# Patient Record
Sex: FEMALE | Race: ASIAN
[De-identification: names, ages, dates, MRNs, and addresses within clinical notes are randomized per-mention and may not be internally consistent; named-entity substitution may affect disease eponyms.]

---

## 2018-07-12 ENCOUNTER — HOSPITAL ENCOUNTER (INPATIENT)
Dept: HOSPITAL 54 - ER | Age: 68
LOS: 3 days | Discharge: PSYCHIATRIC OR SUBSTANCE USE FACILITY | DRG: 682 | End: 2018-07-15
Payer: MEDICARE

## 2018-07-12 VITALS — SYSTOLIC BLOOD PRESSURE: 149 MMHG | DIASTOLIC BLOOD PRESSURE: 91 MMHG

## 2018-07-12 VITALS — HEIGHT: 60 IN | WEIGHT: 104.31 LBS | BODY MASS INDEX: 20.48 KG/M2

## 2018-07-12 DIAGNOSIS — G93.41: ICD-10-CM

## 2018-07-12 DIAGNOSIS — E87.5: ICD-10-CM

## 2018-07-12 DIAGNOSIS — E43: ICD-10-CM

## 2018-07-12 DIAGNOSIS — I13.0: ICD-10-CM

## 2018-07-12 DIAGNOSIS — I21.A1: ICD-10-CM

## 2018-07-12 DIAGNOSIS — I50.9: ICD-10-CM

## 2018-07-12 DIAGNOSIS — N18.9: ICD-10-CM

## 2018-07-12 DIAGNOSIS — E83.42: ICD-10-CM

## 2018-07-12 DIAGNOSIS — K21.9: ICD-10-CM

## 2018-07-12 DIAGNOSIS — M19.90: ICD-10-CM

## 2018-07-12 DIAGNOSIS — F32.9: ICD-10-CM

## 2018-07-12 DIAGNOSIS — D53.9: ICD-10-CM

## 2018-07-12 DIAGNOSIS — N17.0: Primary | ICD-10-CM

## 2018-07-12 DIAGNOSIS — F25.0: ICD-10-CM

## 2018-07-12 DIAGNOSIS — D72.1: ICD-10-CM

## 2018-07-12 DIAGNOSIS — E86.0: ICD-10-CM

## 2018-07-12 DIAGNOSIS — F23: ICD-10-CM

## 2018-07-12 DIAGNOSIS — G40.909: ICD-10-CM

## 2018-07-12 DIAGNOSIS — F01.51: ICD-10-CM

## 2018-07-12 LAB
ALBUMIN SERPL BCP-MCNC: 2.8 G/DL (ref 3.4–5)
ALP SERPL-CCNC: 189 U/L (ref 46–116)
ALT SERPL W P-5'-P-CCNC: 47 U/L (ref 12–78)
APAP SERPL-MCNC: < 2 UG/ML (ref 10–30)
AST SERPL W P-5'-P-CCNC: 27 U/L (ref 15–37)
BASOPHILS # BLD AUTO: 0 /CMM (ref 0–0.2)
BASOPHILS NFR BLD AUTO: 0.4 % (ref 0–2)
BILIRUB DIRECT SERPL-MCNC: 0 MG/DL (ref 0–0.2)
BILIRUB SERPL-MCNC: 0.1 MG/DL (ref 0.2–1)
BUN SERPL-MCNC: 70 MG/DL (ref 7–18)
CALCIUM SERPL-MCNC: 9.5 MG/DL (ref 8.5–10.1)
CHLORIDE SERPL-SCNC: 109 MMOL/L (ref 98–107)
CO2 SERPL-SCNC: 26 MMOL/L (ref 21–32)
CREAT SERPL-MCNC: 1.7 MG/DL (ref 0.6–1.3)
EOSINOPHIL NFR BLD AUTO: 13.4 % (ref 0–6)
ETHANOL SERPL-MCNC: < 3 MG/DL (ref 0–0)
GLUCOSE SERPL-MCNC: 90 MG/DL (ref 74–106)
HCT VFR BLD AUTO: 26 % (ref 33–45)
HGB BLD-MCNC: 8.9 G/DL (ref 11.5–14.8)
LYMPHOCYTES NFR BLD AUTO: 1.5 /CMM (ref 0.8–4.8)
LYMPHOCYTES NFR BLD AUTO: 30 % (ref 20–44)
MCH RBC QN AUTO: 35 PG (ref 26–33)
MCHC RBC AUTO-ENTMCNC: 34 G/DL (ref 31–36)
MCV RBC AUTO: 100 FL (ref 82–100)
MONOCYTES NFR BLD AUTO: 0.6 /CMM (ref 0.1–1.3)
MONOCYTES NFR BLD AUTO: 11.9 % (ref 2–12)
NEUTROPHILS # BLD AUTO: 2.3 /CMM (ref 1.8–8.9)
NEUTROPHILS NFR BLD AUTO: 44.3 % (ref 43–81)
NT-PROBNP SERPL-MCNC: 898 PG/ML (ref 0–125)
PLATELET # BLD AUTO: 256 /CMM (ref 150–450)
POTASSIUM SERPL-SCNC: 6.4 MMOL/L (ref 3.5–5.1)
PROT SERPL-MCNC: 7.4 G/DL (ref 6.4–8.2)
RBC # BLD AUTO: 2.59 MIL/UL (ref 4–5.2)
RDW COEFFICIENT OF VARIATION: 13.6 (ref 11.5–15)
SALICYLATES SERPL-MCNC: < 2.8 MG/DL (ref 2.8–20)
SODIUM SERPL-SCNC: 141 MMOL/L (ref 136–145)
TROPONIN I SERPL-MCNC: 0.15 NG/ML (ref 0–0.06)
WBC NRBC COR # BLD AUTO: 5.1 K/UL (ref 4.3–11)

## 2018-07-12 PROCEDURE — A4606 OXYGEN PROBE USED W OXIMETER: HCPCS

## 2018-07-12 PROCEDURE — Z7610: HCPCS

## 2018-07-12 PROCEDURE — G0480 DRUG TEST DEF 1-7 CLASSES: HCPCS

## 2018-07-12 NOTE — NUR
PT IS STANDING UP AND STATED THAT SHE HAS TO URINATE. PT WAS CLEANED AND NEW 
DIAPER APPLIED BY KANNAN WEBSTER

## 2018-07-12 NOTE — NUR
PT BIB PA WITH A C/[facility] INCREASED AGGITATION AT [facility]. PT APPEARS RESTLESS. PT WAS 
TRIAGED AND TAKEN TO ROOM #16. PT IS ON THE MONITOR AND CONTINUOUS PULSE OX.

## 2018-07-12 NOTE — NUR
RN NOTES:



PATIENT HAS CALMED DOWN AND IS CURRENTLY SLEEPING.

-------------------------------------------------------------------------------

Addendum: 07/13/18 at 0016 by KAILYN RAMACHANDRAN RN

-------------------------------------------------------------------------------

ADDITIONAL NOTES:



Tele monitor in place, Sinus loni 56

## 2018-07-12 NOTE — NUR
TELE RN ADMISSION NOTES:



Patient came to unit via gurney. Patient is confused, aggressive and combative. When given 
care, patient started screaming, hitting, and kicking staff. Patient pulled out the tele 
monitor and IV line. Skin and body assessment done. Skin intact, bruises on R) arm and R) 
leg and scratch on forehead noted. Pictures in chart. Safety measures in place. Bed in low 
locked position, bed alarms on. Will monitor accordingly

## 2018-07-12 NOTE — NUR
RN NOTES:



Patient put on bilateral soft restraints on wrist. Patient aggressive and combative, pulling 
out IV line and  tele monitor and hitting staff. Safety precautions in place. Monitor 
accordingly

## 2018-07-13 VITALS — SYSTOLIC BLOOD PRESSURE: 102 MMHG | DIASTOLIC BLOOD PRESSURE: 56 MMHG

## 2018-07-13 VITALS — SYSTOLIC BLOOD PRESSURE: 152 MMHG | DIASTOLIC BLOOD PRESSURE: 78 MMHG

## 2018-07-13 VITALS — DIASTOLIC BLOOD PRESSURE: 78 MMHG | SYSTOLIC BLOOD PRESSURE: 152 MMHG

## 2018-07-13 VITALS — SYSTOLIC BLOOD PRESSURE: 144 MMHG | DIASTOLIC BLOOD PRESSURE: 64 MMHG

## 2018-07-13 VITALS — DIASTOLIC BLOOD PRESSURE: 88 MMHG | SYSTOLIC BLOOD PRESSURE: 160 MMHG

## 2018-07-13 VITALS — DIASTOLIC BLOOD PRESSURE: 64 MMHG | SYSTOLIC BLOOD PRESSURE: 144 MMHG

## 2018-07-13 VITALS — SYSTOLIC BLOOD PRESSURE: 123 MMHG | DIASTOLIC BLOOD PRESSURE: 73 MMHG

## 2018-07-13 LAB
ALBUMIN SERPL BCP-MCNC: 2.9 G/DL (ref 3.4–5)
ALP SERPL-CCNC: 187 U/L (ref 46–116)
ALT SERPL W P-5'-P-CCNC: 51 U/L (ref 12–78)
AST SERPL W P-5'-P-CCNC: 35 U/L (ref 15–37)
BASOPHILS # BLD AUTO: 0 /CMM (ref 0–0.2)
BASOPHILS NFR BLD AUTO: 0.3 % (ref 0–2)
BILIRUB SERPL-MCNC: 0.1 MG/DL (ref 0.2–1)
BUN SERPL-MCNC: 48 MG/DL (ref 7–18)
BUN SERPL-MCNC: 60 MG/DL (ref 7–18)
CALCIUM SERPL-MCNC: 8.6 MG/DL (ref 8.5–10.1)
CALCIUM SERPL-MCNC: 9.4 MG/DL (ref 8.5–10.1)
CHLORIDE SERPL-SCNC: 113 MMOL/L (ref 98–107)
CHLORIDE SERPL-SCNC: 113 MMOL/L (ref 98–107)
CHOLEST SERPL-MCNC: 148 MG/DL (ref ?–200)
CO2 SERPL-SCNC: 22 MMOL/L (ref 21–32)
CO2 SERPL-SCNC: 22 MMOL/L (ref 21–32)
CREAT SERPL-MCNC: 1.5 MG/DL (ref 0.6–1.3)
CREAT SERPL-MCNC: 1.5 MG/DL (ref 0.6–1.3)
EOSINOPHIL NFR BLD AUTO: 14.4 % (ref 0–6)
GLUCOSE SERPL-MCNC: 68 MG/DL (ref 74–106)
GLUCOSE SERPL-MCNC: 95 MG/DL (ref 74–106)
HCT VFR BLD AUTO: 31 % (ref 33–45)
HDLC SERPL-MCNC: 72 MG/DL (ref 40–60)
HEMOCCULT STL QL: NEGATIVE
HGB BLD-MCNC: 10.6 G/DL (ref 11.5–14.8)
IRON SERPL-MCNC: 111 UG/DL (ref 50–175)
LDLC SERPL DIRECT ASSAY-MCNC: 55 MG/DL (ref 0–99)
LYMPHOCYTES NFR BLD AUTO: 1.5 /CMM (ref 0.8–4.8)
LYMPHOCYTES NFR BLD AUTO: 30.3 % (ref 20–44)
MAGNESIUM SERPL-MCNC: 2.4 MG/DL (ref 1.8–2.4)
MCH RBC QN AUTO: 35 PG (ref 26–33)
MCHC RBC AUTO-ENTMCNC: 34 G/DL (ref 31–36)
MCV RBC AUTO: 102 FL (ref 82–100)
MONOCYTES NFR BLD AUTO: 0.4 /CMM (ref 0.1–1.3)
MONOCYTES NFR BLD AUTO: 8.7 % (ref 2–12)
NEUTROPHILS # BLD AUTO: 2.2 /CMM (ref 1.8–8.9)
NEUTROPHILS NFR BLD AUTO: 46.3 % (ref 43–81)
PHOSPHATE SERPL-MCNC: 4.2 MG/DL (ref 2.5–4.9)
PLATELET # BLD AUTO: 245 /CMM (ref 150–450)
POTASSIUM SERPL-SCNC: 5.3 MMOL/L (ref 3.5–5.1)
POTASSIUM SERPL-SCNC: 5.9 MMOL/L (ref 3.5–5.1)
PROT SERPL-MCNC: 8.1 G/DL (ref 6.4–8.2)
RBC # BLD AUTO: 3.08 MIL/UL (ref 4–5.2)
RDW COEFFICIENT OF VARIATION: 13.9 (ref 11.5–15)
SODIUM SERPL-SCNC: 144 MMOL/L (ref 136–145)
SODIUM SERPL-SCNC: 146 MMOL/L (ref 136–145)
TIBC SERPL-MCNC: 209 UG/DL (ref 250–450)
TRIGL SERPL-MCNC: 111 MG/DL (ref 30–150)
TROPONIN I SERPL-MCNC: 0.19 NG/ML (ref 0–0.06)
TSH SERPL DL<=0.005 MIU/L-ACNC: 5.58 UIU/ML (ref 0.36–3.74)
WBC NRBC COR # BLD AUTO: 4.8 K/UL (ref 4.3–11)

## 2018-07-13 RX ADMIN — HALOPERIDOL LACTATE PRN MG: 5 INJECTION, SOLUTION INTRAMUSCULAR at 01:29

## 2018-07-13 RX ADMIN — PRIMIDONE SCH MG: 250 TABLET ORAL at 08:42

## 2018-07-13 RX ADMIN — Medication SCH TAB: at 08:42

## 2018-07-13 RX ADMIN — HALOPERIDOL LACTATE PRN MG: 5 INJECTION, SOLUTION INTRAMUSCULAR at 14:37

## 2018-07-13 RX ADMIN — DIVALPROEX SODIUM SCH MG: 500 TABLET, DELAYED RELEASE ORAL at 08:42

## 2018-07-13 RX ADMIN — Medication SCH MG: at 10:21

## 2018-07-13 RX ADMIN — Medication SCH MG: at 08:36

## 2018-07-13 RX ADMIN — PRIMIDONE SCH MG: 250 TABLET ORAL at 13:13

## 2018-07-13 RX ADMIN — DIVALPROEX SODIUM SCH MG: 250 TABLET, DELAYED RELEASE ORAL at 14:37

## 2018-07-13 RX ADMIN — Medication SCH MG: at 22:26

## 2018-07-13 RX ADMIN — PRIMIDONE SCH MG: 250 TABLET ORAL at 16:37

## 2018-07-13 RX ADMIN — DIVALPROEX SODIUM SCH MG: 500 TABLET, DELAYED RELEASE ORAL at 16:37

## 2018-07-13 RX ADMIN — PANTOPRAZOLE SODIUM SCH MG: 40 TABLET, DELAYED RELEASE ORAL at 08:36

## 2018-07-13 NOTE — NUR
RN CLOSING NOTES:



Patient sleeping in bed. Breathing even and unlabored. Not in any distress. Tele monitor in 
place, Sinus Rhythm 82. Bilateral soft wrist restraints in place, Q2H checks done. DVT pump 
in place. All needs attended to. Medication given as ordered. Will endorse WINDY to am shift 
RN.

## 2018-07-13 NOTE — NUR
RN TELE NOTES:



Patient is currently sleeping, zyprexa not given. Patient gets aggressive and combative when 
disturbed

## 2018-07-13 NOTE — NUR
RN NOTES

PATIENT RECEIVED AWAKE ALERT AND VERBALLY RESPONSIVE, CONFUSED, ABLE TO MAKE NEEDS KNOWN, 
RESPIRATIONS EVEN AND UNLABORED, ABLE TO MAKE NEEDS KNOWN, DENIES ANY PAIN OR DISCOMFORT AT 
THIS TIME. ON SOFT WRIST RESTRAINTS, PATIENT WITH NO IV ACCESS MD AWARE. SAFETY MEASURES IN 
PLACE, REMINDED PATIENT TO CALL WHEN ASSISTANCE IS NEEDED. WILL CONTINUE TO FOLLOW UP WITH 
PT CARE NEEDS, CALL LIGHT WITH REACH WILL CONTINUE TO MONITOR

## 2018-07-13 NOTE — NUR
RN CLOSING NOTES

PATIENT SLEEPING COMFORTABLY IN BED ABLE TO WAKE UP AND VERBALLY RESPONSIVE, CONFUSED, ABLE 
TO MAKE NEEDS KNOWN, RESPIRATIONS EVEN AND UNLABORED,IN NO APPARENT PAIN OR DISCOMFORT AT 
THIS TIME. ON SOFT WRIST RESTRAINTS, PATIENT WITH  IV ACCESS TO NIRU 22G PATENT AND INTACT NO 
REDNESS OR INFILTRATION NOTED. SAFETY MEASURES IN PLACE, REMINDED PATIENT TO CALL WHEN 
ASSISTANCE IS NEEDED. WILL CONTINUE TO FOLLOW UP WITH PT CARE NEEDS, CALL LIGHT WITH REACH 
WILL CONTINUE TO MONITOR AND ENDORSED TO NEXT SHIFT FOR CONTINUITY OF CARE

## 2018-07-13 NOTE — NUR
ms/rn notes

PATIENT OBSERVE ASLEEP AT THIS TIME, RESTRAINT REMOVE. IV REMOVED 30 MINUTEWS AGO AND 
PATIENT REFUSE TO HAVE IV REINSERTION AT THIS TIME.MONITORING.ABLE TO TOLERTAE MEDICATION.

## 2018-07-13 NOTE — NUR
MS/RN OPENING NOTES

PATIENT IN BED, DISORIENTED AND REQUIRE ASSISTANCE AT ALL TIMES, IV WAS PULLED OUT AND 
TRYING TO GET OUT OF BED, PER AM RN , HALDOL WAS GIVEN BEFORE THE START OF SHIFT. REQUIRE 
RESTRAINT AND SITTER FOR SAFETY AS PATIENT ATTEMPT S TO HIT AND KICK, ORDERED SITTER AND 
RESTRAINT REQUIRE RENEWAL. RESPIRATIONS EVEN AND UNLABORED. WILL MONITOR FOR SAFETY, KEPT 
SKIN INTACT AND DRY, HAD BM, CHANGE AND KEPT CLEAN.

## 2018-07-13 NOTE — NUR
Patient resides at [facility] [phone] was transferred due to increased 
agitation and aggressive behavior towards the staff and residents of the facility. D/C plan 
is possible true vs back to [facility] .

-------------------------------------------------------------------------------

Addendum: 07/13/18 at 1721 by VIOLET POOL RN

-------------------------------------------------------------------------------

Amended: Links added.

## 2018-07-14 VITALS — DIASTOLIC BLOOD PRESSURE: 51 MMHG | SYSTOLIC BLOOD PRESSURE: 100 MMHG

## 2018-07-14 VITALS — SYSTOLIC BLOOD PRESSURE: 155 MMHG | DIASTOLIC BLOOD PRESSURE: 79 MMHG

## 2018-07-14 VITALS — DIASTOLIC BLOOD PRESSURE: 73 MMHG | SYSTOLIC BLOOD PRESSURE: 124 MMHG

## 2018-07-14 VITALS — DIASTOLIC BLOOD PRESSURE: 73 MMHG | SYSTOLIC BLOOD PRESSURE: 158 MMHG

## 2018-07-14 LAB
BASOPHILS # BLD AUTO: 0 /CMM (ref 0–0.2)
BASOPHILS NFR BLD AUTO: 0.3 % (ref 0–2)
BUN SERPL-MCNC: 43 MG/DL (ref 7–18)
CALCIUM SERPL-MCNC: 9.2 MG/DL (ref 8.5–10.1)
CHLORIDE SERPL-SCNC: 110 MMOL/L (ref 98–107)
CO2 SERPL-SCNC: 17 MMOL/L (ref 21–32)
CREAT SERPL-MCNC: 1.5 MG/DL (ref 0.6–1.3)
CREAT UR-MCNC: 24.8 MG/DL (ref 30–125)
EOSINOPHIL NFR BLD AUTO: 6.2 % (ref 0–6)
GLUCOSE SERPL-MCNC: 86 MG/DL (ref 74–106)
HCT VFR BLD AUTO: 28 % (ref 33–45)
HGB BLD-MCNC: 9.2 G/DL (ref 11.5–14.8)
LYMPHOCYTES NFR BLD AUTO: 1.1 /CMM (ref 0.8–4.8)
LYMPHOCYTES NFR BLD AUTO: 13.6 % (ref 20–44)
MAGNESIUM SERPL-MCNC: 2 MG/DL (ref 1.8–2.4)
MCH RBC QN AUTO: 34 PG (ref 26–33)
MCHC RBC AUTO-ENTMCNC: 33 G/DL (ref 31–36)
MCV RBC AUTO: 103 FL (ref 82–100)
MONOCYTES NFR BLD AUTO: 0.7 /CMM (ref 0.1–1.3)
MONOCYTES NFR BLD AUTO: 8.9 % (ref 2–12)
NEUTROPHILS # BLD AUTO: 5.8 /CMM (ref 1.8–8.9)
NEUTROPHILS NFR BLD AUTO: 71 % (ref 43–81)
PHOSPHATE SERPL-MCNC: 4.1 MG/DL (ref 2.5–4.9)
PLATELET # BLD AUTO: 272 /CMM (ref 150–450)
POTASSIUM SERPL-SCNC: 5.5 MMOL/L (ref 3.5–5.1)
RBC # BLD AUTO: 2.7 MIL/UL (ref 4–5.2)
RDW COEFFICIENT OF VARIATION: 14.3 (ref 11.5–15)
SODIUM SERPL-SCNC: 141 MMOL/L (ref 136–145)
SODIUM UR-SCNC: 56 MMOL/L (ref 40–220)
TROPONIN I SERPL-MCNC: 0.27 NG/ML (ref 0–0.06)
WBC NRBC COR # BLD AUTO: 8.1 K/UL (ref 4.3–11)

## 2018-07-14 RX ADMIN — Medication SCH MG: at 09:51

## 2018-07-14 RX ADMIN — HALOPERIDOL LACTATE PRN MG: 5 INJECTION, SOLUTION INTRAMUSCULAR at 00:30

## 2018-07-14 RX ADMIN — PRIMIDONE SCH MG: 250 TABLET ORAL at 12:36

## 2018-07-14 RX ADMIN — DIVALPROEX SODIUM SCH MG: 500 TABLET, DELAYED RELEASE ORAL at 09:51

## 2018-07-14 RX ADMIN — Medication SCH TAB: at 09:50

## 2018-07-14 RX ADMIN — DIVALPROEX SODIUM SCH MG: 500 TABLET, DELAYED RELEASE ORAL at 17:11

## 2018-07-14 RX ADMIN — PRIMIDONE SCH MG: 250 TABLET ORAL at 09:54

## 2018-07-14 RX ADMIN — DIVALPROEX SODIUM SCH MG: 250 TABLET, DELAYED RELEASE ORAL at 12:36

## 2018-07-14 RX ADMIN — OLANZAPINE SCH MG: 2.5 TABLET ORAL at 14:55

## 2018-07-14 RX ADMIN — Medication SCH MG: at 21:56

## 2018-07-14 RX ADMIN — PANTOPRAZOLE SODIUM SCH MG: 40 TABLET, DELAYED RELEASE ORAL at 09:51

## 2018-07-14 RX ADMIN — Medication SCH MG: at 09:50

## 2018-07-14 RX ADMIN — PRIMIDONE SCH MG: 250 TABLET ORAL at 17:11

## 2018-07-14 NOTE — NUR
MS/RN NOTES

GALILEA GOOD WAS MADE AWARE REGARDING PATIENT REMOVING AND REFUSAL TO HAVE IV REINSERTED, ABLE 
TO DRINK, PROVIDE FLUIDS AND TOLERTAED FLUIDS HOB ELEVATED. MD ORDER TO D/C IV FLUID , ORDER 
CARRIED OUT.

## 2018-07-14 NOTE — NUR
MS/RN NOTES

PATIENT EXHIBITING AGITATION, SCREAMING AND KICKING, AS NEEDED ZYPREXA TO BE GIVEN, UNABLE 
TO GIVE AS PATIENT REFUSAL TO TAKE MEDICATION .

## 2018-07-14 NOTE — NUR
RN NOTES:

PT TOOK MEDICATION, WHOLE PILL WITH APPLE SAUCE, COOPERATIVE, DRINK JUICE, ON ASPIRATION 
PRECAUTION

## 2018-07-14 NOTE — NUR
308-2

MS/RN NOTES

PATIENT REQUIRE FREQUENT MONITORING, WITH A SITTER, ABLE TO REMOVE RESTRAINT AND MONITORING 
FOR ANY CHANGES, KEPT SKIN INTACT AND DRY, WILL  MONITOR.REQUIRE OXYGEN VIA NC FOR COMDORT. 
CALL LIGHTS WITHIN REACH, BED IN LOCK POSITION.WILL ENDORSE TO AM RN FOR CO. PATIENT ABLE TO 
SLEEP FEW HOURS, ABLE TO DRINK FLUIDS,WITH SITTER

## 2018-07-14 NOTE — NUR
MS/RN NOTES

PATIENT PULLED OUT IV , INFORMED ONCJEAN-PAUL GOOD, MADE AWARE, SAID TO PROVIDE FLUIDS AND MONITOR 
INTAKE, CAN D/C IV FLUIDS.

## 2018-07-14 NOTE — NUR
RN INITIAL NOTES:

PATIENT AWAKE/VERBALLY RESPONSIVE, ABLE TO MAKE NEEDS KNOWN, BUT CONFUSED, SITTER AT BED 
SIDE. NO SOB NOTED, NO FACIAL GRIMACE NOTED, NO IV ACCESS AS PT KEPT PULLING IT OUT, MD 
AWARE. RECEIVED ON BILATERAL SOFT WRIST RESTRAINT, RESTRAINT PROTOCOL FOLLOWED, PT ABLE TO 
MOVE AND WIGGLE ARMS AND HANDS, WITH RADIAL PULSES PALPABLE AND INTACT, GOOD CAPILLARY 
REFILL NOTED, NO S/S OF IMPEDIMENT IN CIRCULATION NOTED. SAFETY PRECAUTIONS FOR FALL 
INITIATED, CALL LIGHT WITH REACH WILL CONTINUE TO MONITOR.

## 2018-07-14 NOTE — NUR
RN NOTES

PATIENT AWAKE ALERT AND VERBALLY RESPONSIVE, CONFUSED, ABLE TO MAKE NEEDS KNOWN, 
RESPIRATIONS EVEN AND UNLABORED, ABLE TO MAKE NEEDS KNOWN, DENIES ANY PAIN OR DISCOMFORT AT 
THIS TIME. ON SOFT WRIST RESTRAINTS, PATIENT WITH NO IV ACCESS MD AWARE. SAFETY MEASURES IN 
PLACE, REMINDED PATIENT TO CALL WHEN ASSISTANCE IS NEEDED. WILL CONTINUE TO FOLLOW UP WITH 
PT CARE NEEDS, CALL LIGHT WITH REACH WILL CONTINUE TO MONITOR AND ENDORSED TO NEXT SHIFT FOR 
CONTINUITY OF CARE

## 2018-07-14 NOTE — NUR
ms/rn notes

patietn awaken from sleep, restless and require as needed medication haldol due and im 
injection given , able to tolerate and monitoring any changes.

## 2018-07-15 ENCOUNTER — HOSPITAL ENCOUNTER (INPATIENT)
Dept: HOSPITAL 54 - GPSOV | Age: 68
LOS: 11 days | Discharge: NURSING, REHAB OR LONG-TERM CARE | DRG: 885 | End: 2018-07-26
Attending: INTERNAL MEDICINE | Admitting: PSYCHIATRY & NEUROLOGY
Payer: MEDICARE

## 2018-07-15 VITALS — HEIGHT: 60 IN | WEIGHT: 104 LBS | BODY MASS INDEX: 20.42 KG/M2

## 2018-07-15 VITALS — SYSTOLIC BLOOD PRESSURE: 147 MMHG | DIASTOLIC BLOOD PRESSURE: 77 MMHG

## 2018-07-15 VITALS — DIASTOLIC BLOOD PRESSURE: 94 MMHG | SYSTOLIC BLOOD PRESSURE: 133 MMHG

## 2018-07-15 VITALS — SYSTOLIC BLOOD PRESSURE: 133 MMHG | DIASTOLIC BLOOD PRESSURE: 94 MMHG

## 2018-07-15 DIAGNOSIS — M19.90: ICD-10-CM

## 2018-07-15 DIAGNOSIS — E87.5: ICD-10-CM

## 2018-07-15 DIAGNOSIS — I12.9: ICD-10-CM

## 2018-07-15 DIAGNOSIS — N25.81: ICD-10-CM

## 2018-07-15 DIAGNOSIS — F25.0: ICD-10-CM

## 2018-07-15 DIAGNOSIS — I21.A1: ICD-10-CM

## 2018-07-15 DIAGNOSIS — N17.9: ICD-10-CM

## 2018-07-15 DIAGNOSIS — F29: Primary | ICD-10-CM

## 2018-07-15 DIAGNOSIS — F01.51: ICD-10-CM

## 2018-07-15 DIAGNOSIS — E86.0: ICD-10-CM

## 2018-07-15 DIAGNOSIS — K21.9: ICD-10-CM

## 2018-07-15 DIAGNOSIS — D53.9: ICD-10-CM

## 2018-07-15 DIAGNOSIS — G93.40: ICD-10-CM

## 2018-07-15 DIAGNOSIS — N18.9: ICD-10-CM

## 2018-07-15 DIAGNOSIS — G40.909: ICD-10-CM

## 2018-07-15 DIAGNOSIS — D72.1: ICD-10-CM

## 2018-07-15 DIAGNOSIS — E44.1: ICD-10-CM

## 2018-07-15 LAB
BASOPHILS NFR BLD AUTO: 0.6 % (ref 0–2)
BUN SERPL-MCNC: 35 MG/DL (ref 7–18)
CALCIUM SERPL-MCNC: 8.7 MG/DL (ref 8.5–10.1)
CHLORIDE SERPL-SCNC: 110 MMOL/L (ref 98–107)
CO2 SERPL-SCNC: 23 MMOL/L (ref 21–32)
CREAT SERPL-MCNC: 1.5 MG/DL (ref 0.6–1.3)
EOSINOPHIL NFR BLD AUTO: 8.8 % (ref 0–6)
GLUCOSE SERPL-MCNC: 80 MG/DL (ref 74–106)
HCT VFR BLD AUTO: 27 % (ref 33–45)
HGB BLD-MCNC: 9.2 G/DL (ref 11.5–14.8)
LYMPHOCYTES NFR BLD AUTO: 38 % (ref 20–44)
MAGNESIUM SERPL-MCNC: 1.7 MG/DL (ref 1.8–2.4)
MCH RBC QN AUTO: 35 PG (ref 26–33)
MCHC RBC AUTO-ENTMCNC: 34 G/DL (ref 31–36)
MCV RBC AUTO: 102 FL (ref 82–100)
MONOCYTES NFR BLD AUTO: 8.5 % (ref 2–12)
NEUTROPHILS NFR BLD AUTO: 44.1 % (ref 43–81)
PHOSPHATE SERPL-MCNC: 4.4 MG/DL (ref 2.5–4.9)
PLATELET # BLD AUTO: 261 /CMM (ref 150–450)
POTASSIUM SERPL-SCNC: 4.8 MMOL/L (ref 3.5–5.1)
RBC # BLD AUTO: 2.63 MIL/UL (ref 4–5.2)
RDW COEFFICIENT OF VARIATION: 14.4 (ref 11.5–15)
SODIUM SERPL-SCNC: 142 MMOL/L (ref 136–145)
WBC NRBC COR # BLD AUTO: 5 K/UL (ref 4.3–11)

## 2018-07-15 RX ADMIN — Medication SCH MG: at 09:00

## 2018-07-15 RX ADMIN — Medication SCH TAB: at 09:00

## 2018-07-15 RX ADMIN — Medication SCH MG: at 10:12

## 2018-07-15 RX ADMIN — BENZTROPINE MESYLATE SCH MG: 1 TABLET ORAL at 14:58

## 2018-07-15 RX ADMIN — DIVALPROEX SODIUM SCH MG: 500 TABLET, DELAYED RELEASE ORAL at 09:00

## 2018-07-15 RX ADMIN — DIVALPROEX SODIUM SCH MG: 500 TABLET, DELAYED RELEASE ORAL at 16:34

## 2018-07-15 RX ADMIN — OLANZAPINE SCH MG: 2.5 TABLET ORAL at 13:25

## 2018-07-15 RX ADMIN — OLANZAPINE SCH MG: 2.5 TABLET ORAL at 07:13

## 2018-07-15 RX ADMIN — DIVALPROEX SODIUM SCH MG: 250 TABLET, DELAYED RELEASE ORAL at 13:25

## 2018-07-15 RX ADMIN — PRIMIDONE SCH MG: 250 TABLET ORAL at 09:00

## 2018-07-15 RX ADMIN — PRIMIDONE SCH MG: 250 TABLET ORAL at 16:34

## 2018-07-15 RX ADMIN — PANTOPRAZOLE SODIUM SCH MG: 40 TABLET, DELAYED RELEASE ORAL at 07:13

## 2018-07-15 RX ADMIN — BENZTROPINE MESYLATE SCH MG: 1 TABLET ORAL at 16:34

## 2018-07-15 RX ADMIN — Medication SCH MG: at 10:11

## 2018-07-15 RX ADMIN — PRIMIDONE SCH MG: 250 TABLET ORAL at 10:11

## 2018-07-15 RX ADMIN — Medication SCH TAB: at 10:11

## 2018-07-15 RX ADMIN — PRIMIDONE SCH MG: 250 TABLET ORAL at 13:25

## 2018-07-15 NOTE — NUR
RN NOTES:

RESTRAINT REMAINS RELEASED AT THIS TIME, AS PT SLEEPING AND CALM, SITTER AT BED SIDE, WILL 
MONITOR THE NEED FOR RESTRAINT

## 2018-07-15 NOTE — NUR
RN NOTES:

DECIDED TO RELEASE THE RESTRAINT AT THIS TIME, PT IS CALM, SLEEPING, NO FACIAL GRIMACE 
NOTED, SITTER AT BED ISDE, WILL MONITOR THE NEED FOR RESTRAINT

## 2018-07-15 NOTE — NUR
RN NOTES



PT. REFUSING TO TAKE PO MEDICATIONS. TRIED TO FEED PT. PUDDING, AND CANNED PEACHES, AND PT. 
WAS SPITTING OUT HER FOOD.

## 2018-07-15 NOTE — NUR
RN CLOSING NOTES:

PT IN BED, AWAKE, REMAINS CONFUSED, ABLE TO MAKE NEEDS KNOWN, PT WENT TO THE RESTROOM 
ASSISTED BY CNA COUPLE OF TIMES TO VOID. NO IV ACCESS, MD AWARE. PT PERIODIC EPISODE OF 
BEING COOPERATIVE AND UNCOOPERATIVE. BILATERAL SOFT WRIST RESTRAINT IN PLACED, PT IS 
RESTLESS, RESTRAINT PROTOCOL FOLLOWED, RADIAL PULSES PALPABLE, WITH GOOD CAPILLARY REFILL 
NOTED, PT ABLE TO MOVE AND WIGGLE ARMS . NO S/S OF IMPEDIMENT IN CIRCULATION NOTED. BLE 
OFFLOADED. SITTER AT BED SIDE. VS REMAINS STABLE, NEEDS ATTENDED. SAFETY PRECAUTIONS FOR 
FALL REMAINS ENGAGED. CALL LIGHT IN REACH. WILL ENDORSE TO DAY RN FOR WINDY.

## 2018-07-15 NOTE — NUR
RN OPENING NOTES



RECEIVED PT. IN BED A&OX1, CONFUSED, AND DISORIENTED, 1:1 SITTER AT BEDSIDE. PT. HAS 
BILATERAL SOFT WRIST RESTRAINTS DUE TO PT. BEHAVING COMBATIVE, AND PULLING OUT LINES PER 
NURSE REPORT. PT. IS BREATHING UNLABORED, AND EVENLY ON ROOM AIR. NO S/S OF ACUTE DISTRESS. 
BED ALARM ON. BED IS IN LOWEST, AND LOCKED POSITION, AND 2 SIDE RAILS UP. WILL CONTINUE TO 
ASSESS AND MONITOR. ALL NEEDS MET AT THIS TIME.

## 2018-07-15 NOTE — NUR
DISCHARGE FROM MED/SURG



PT. WAS DISCHARGE FROM [facility] AND WILL BE TRANSFERRED TO [facility]. 
DISCHARGE INSTRUCTIONS CO SIGNED WITH ANOTHER NURSE DUE TO PT. UNABLE TO DUE TO HER 
CONDITION. PT. IS MEDICALLY STABLE. PLACED IN CHART PICTURES TAKEN BEFORE DISCHARGE, AND 
BELONGINGS LIST SIGNED AND CHECKED.

## 2018-07-16 VITALS — SYSTOLIC BLOOD PRESSURE: 110 MMHG | DIASTOLIC BLOOD PRESSURE: 59 MMHG

## 2018-07-16 VITALS — SYSTOLIC BLOOD PRESSURE: 112 MMHG | DIASTOLIC BLOOD PRESSURE: 83 MMHG

## 2018-07-16 RX ADMIN — Medication SCH MG: at 08:26

## 2018-07-16 RX ADMIN — Medication SCH MG: at 08:32

## 2018-07-16 RX ADMIN — PRIMIDONE SCH MG: 250 TABLET ORAL at 13:09

## 2018-07-16 RX ADMIN — Medication SCH MG: at 22:28

## 2018-07-16 RX ADMIN — VALPROIC ACID SCH MG: 250 SOLUTION ORAL at 17:46

## 2018-07-16 RX ADMIN — BENZTROPINE MESYLATE SCH MG: 1 TABLET ORAL at 17:46

## 2018-07-16 RX ADMIN — Medication SCH MG: at 08:25

## 2018-07-16 RX ADMIN — PANTOPRAZOLE SODIUM SCH MG: 40 TABLET, DELAYED RELEASE ORAL at 08:25

## 2018-07-16 RX ADMIN — Medication SCH TAB: at 08:25

## 2018-07-16 RX ADMIN — PRIMIDONE SCH MG: 250 TABLET ORAL at 18:29

## 2018-07-16 RX ADMIN — PRIMIDONE SCH MG: 250 TABLET ORAL at 08:33

## 2018-07-16 RX ADMIN — TEMAZEPAM PRN MG: 15 CAPSULE ORAL at 00:27

## 2018-07-17 VITALS — SYSTOLIC BLOOD PRESSURE: 148 MMHG | DIASTOLIC BLOOD PRESSURE: 80 MMHG

## 2018-07-17 VITALS — DIASTOLIC BLOOD PRESSURE: 60 MMHG | SYSTOLIC BLOOD PRESSURE: 101 MMHG

## 2018-07-17 VITALS — DIASTOLIC BLOOD PRESSURE: 81 MMHG | SYSTOLIC BLOOD PRESSURE: 136 MMHG

## 2018-07-17 RX ADMIN — TEMAZEPAM PRN MG: 15 CAPSULE ORAL at 01:15

## 2018-07-17 RX ADMIN — PRIMIDONE SCH MG: 250 TABLET ORAL at 12:26

## 2018-07-17 RX ADMIN — HALOPERIDOL SCH MG: 5 TABLET ORAL at 17:43

## 2018-07-17 RX ADMIN — VALPROIC ACID SCH MG: 250 SOLUTION ORAL at 17:43

## 2018-07-17 RX ADMIN — PANTOPRAZOLE SODIUM SCH MG: 40 TABLET, DELAYED RELEASE ORAL at 08:50

## 2018-07-17 RX ADMIN — Medication SCH MG: at 08:50

## 2018-07-17 RX ADMIN — TEMAZEPAM PRN MG: 15 CAPSULE ORAL at 22:04

## 2018-07-17 RX ADMIN — PRIMIDONE SCH MG: 250 TABLET ORAL at 08:54

## 2018-07-17 RX ADMIN — PRIMIDONE SCH MG: 250 TABLET ORAL at 17:44

## 2018-07-17 RX ADMIN — Medication SCH MG: at 21:23

## 2018-07-17 RX ADMIN — Medication SCH TAB: at 08:50

## 2018-07-17 RX ADMIN — HALOPERIDOL PRN MG: 5 TABLET ORAL at 10:40

## 2018-07-17 RX ADMIN — BENZTROPINE MESYLATE SCH MG: 1 TABLET ORAL at 12:25

## 2018-07-17 RX ADMIN — VALPROIC ACID SCH MG: 250 SOLUTION ORAL at 08:49

## 2018-07-17 RX ADMIN — BENZTROPINE MESYLATE SCH MG: 1 TABLET ORAL at 08:50

## 2018-07-17 RX ADMIN — HALOPERIDOL SCH MG: 5 TABLET ORAL at 12:25

## 2018-07-17 RX ADMIN — VALPROIC ACID SCH MG: 250 SOLUTION ORAL at 12:24

## 2018-07-17 RX ADMIN — BENZTROPINE MESYLATE SCH MG: 1 TABLET ORAL at 17:43

## 2018-07-18 VITALS — DIASTOLIC BLOOD PRESSURE: 51 MMHG | SYSTOLIC BLOOD PRESSURE: 100 MMHG

## 2018-07-18 VITALS — SYSTOLIC BLOOD PRESSURE: 109 MMHG | DIASTOLIC BLOOD PRESSURE: 61 MMHG

## 2018-07-18 RX ADMIN — Medication SCH TAB: at 09:27

## 2018-07-18 RX ADMIN — HALOPERIDOL SCH MG: 5 TABLET ORAL at 12:01

## 2018-07-18 RX ADMIN — BENZTROPINE MESYLATE SCH MG: 1 TABLET ORAL at 09:27

## 2018-07-18 RX ADMIN — PRIMIDONE SCH MG: 250 TABLET ORAL at 12:22

## 2018-07-18 RX ADMIN — PRIMIDONE SCH MG: 250 TABLET ORAL at 17:29

## 2018-07-18 RX ADMIN — BENZTROPINE MESYLATE SCH MG: 1 TABLET ORAL at 17:29

## 2018-07-18 RX ADMIN — VALPROIC ACID SCH MG: 250 SOLUTION ORAL at 12:01

## 2018-07-18 RX ADMIN — Medication SCH MG: at 09:27

## 2018-07-18 RX ADMIN — TEMAZEPAM PRN MG: 15 CAPSULE ORAL at 23:44

## 2018-07-18 RX ADMIN — Medication SCH MG: at 09:00

## 2018-07-18 RX ADMIN — PANTOPRAZOLE SODIUM SCH MG: 40 TABLET, DELAYED RELEASE ORAL at 09:26

## 2018-07-18 RX ADMIN — BENZTROPINE MESYLATE SCH MG: 1 TABLET ORAL at 12:01

## 2018-07-18 RX ADMIN — VALPROIC ACID SCH MG: 250 SOLUTION ORAL at 17:28

## 2018-07-18 RX ADMIN — HALOPERIDOL SCH MG: 5 TABLET ORAL at 17:29

## 2018-07-18 RX ADMIN — PRIMIDONE SCH MG: 250 TABLET ORAL at 09:37

## 2018-07-18 RX ADMIN — HALOPERIDOL SCH MG: 5 TABLET ORAL at 09:27

## 2018-07-18 RX ADMIN — Medication SCH MG: at 21:44

## 2018-07-18 RX ADMIN — VALPROIC ACID SCH MG: 250 SOLUTION ORAL at 09:27

## 2018-07-19 VITALS — SYSTOLIC BLOOD PRESSURE: 132 MMHG | DIASTOLIC BLOOD PRESSURE: 73 MMHG

## 2018-07-19 VITALS — DIASTOLIC BLOOD PRESSURE: 64 MMHG | SYSTOLIC BLOOD PRESSURE: 134 MMHG

## 2018-07-19 VITALS — DIASTOLIC BLOOD PRESSURE: 66 MMHG | SYSTOLIC BLOOD PRESSURE: 113 MMHG

## 2018-07-19 LAB
BASOPHILS # BLD AUTO: 0 /CMM (ref 0–0.2)
BASOPHILS NFR BLD AUTO: 0.1 % (ref 0–2)
BUN SERPL-MCNC: 55 MG/DL (ref 7–18)
CALCIUM SERPL-MCNC: 8.9 MG/DL (ref 8.5–10.1)
CHLORIDE SERPL-SCNC: 104 MMOL/L (ref 98–107)
CO2 SERPL-SCNC: 27 MMOL/L (ref 21–32)
CREAT SERPL-MCNC: 2 MG/DL (ref 0.6–1.3)
EOSINOPHIL NFR BLD AUTO: 9.6 % (ref 0–6)
GLUCOSE SERPL-MCNC: 102 MG/DL (ref 74–106)
HCT VFR BLD AUTO: 27 % (ref 33–45)
HGB BLD-MCNC: 8.9 G/DL (ref 11.5–14.8)
LYMPHOCYTES NFR BLD AUTO: 1.5 /CMM (ref 0.8–4.8)
LYMPHOCYTES NFR BLD AUTO: 17 % (ref 20–44)
MAGNESIUM SERPL-MCNC: 2.4 MG/DL (ref 1.8–2.4)
MCH RBC QN AUTO: 35 PG (ref 26–33)
MCHC RBC AUTO-ENTMCNC: 34 G/DL (ref 31–36)
MCV RBC AUTO: 104 FL (ref 82–100)
MONOCYTES NFR BLD AUTO: 0.6 /CMM (ref 0.1–1.3)
MONOCYTES NFR BLD AUTO: 6.7 % (ref 2–12)
NEUTROPHILS # BLD AUTO: 6 /CMM (ref 1.8–8.9)
NEUTROPHILS NFR BLD AUTO: 66.6 % (ref 43–81)
PHOSPHATE SERPL-MCNC: 4.4 MG/DL (ref 2.5–4.9)
PLATELET # BLD AUTO: 268 /CMM (ref 150–450)
POTASSIUM SERPL-SCNC: 4.8 MMOL/L (ref 3.5–5.1)
RBC # BLD AUTO: 2.57 MIL/UL (ref 4–5.2)
RDW COEFFICIENT OF VARIATION: 14.7 (ref 11.5–15)
SODIUM SERPL-SCNC: 138 MMOL/L (ref 136–145)
WBC NRBC COR # BLD AUTO: 9 K/UL (ref 4.3–11)

## 2018-07-19 RX ADMIN — BENZTROPINE MESYLATE SCH MG: 1 TABLET ORAL at 08:22

## 2018-07-19 RX ADMIN — VALPROIC ACID SCH MG: 250 SOLUTION ORAL at 13:33

## 2018-07-19 RX ADMIN — HALOPERIDOL PRN MG: 5 TABLET ORAL at 02:19

## 2018-07-19 RX ADMIN — BENZTROPINE MESYLATE SCH MG: 1 TABLET ORAL at 09:00

## 2018-07-19 RX ADMIN — Medication SCH MG: at 09:00

## 2018-07-19 RX ADMIN — BENZTROPINE MESYLATE SCH MG: 1 TABLET ORAL at 17:00

## 2018-07-19 RX ADMIN — HALOPERIDOL SCH MG: 5 TABLET ORAL at 08:00

## 2018-07-19 RX ADMIN — Medication SCH MG: at 08:22

## 2018-07-19 RX ADMIN — Medication SCH TAB: at 08:22

## 2018-07-19 RX ADMIN — VALPROIC ACID SCH MG: 250 SOLUTION ORAL at 08:21

## 2018-07-19 RX ADMIN — VALPROIC ACID SCH MG: 250 SOLUTION ORAL at 17:00

## 2018-07-19 RX ADMIN — Medication SCH TAB: at 09:00

## 2018-07-19 RX ADMIN — HALOPERIDOL SCH MG: 5 TABLET ORAL at 13:44

## 2018-07-19 RX ADMIN — PRIMIDONE SCH MG: 250 TABLET ORAL at 17:19

## 2018-07-19 RX ADMIN — BENZTROPINE MESYLATE SCH MG: 1 TABLET ORAL at 17:18

## 2018-07-19 RX ADMIN — PANTOPRAZOLE SODIUM SCH MG: 40 TABLET, DELAYED RELEASE ORAL at 08:22

## 2018-07-19 RX ADMIN — RISPERIDONE SCH MG: 0.5 TABLET, ORALLY DISINTEGRATING ORAL at 17:18

## 2018-07-19 RX ADMIN — BENZTROPINE MESYLATE SCH MG: 1 TABLET ORAL at 13:33

## 2018-07-19 RX ADMIN — VALPROIC ACID SCH MG: 250 SOLUTION ORAL at 17:18

## 2018-07-19 RX ADMIN — PRIMIDONE SCH MG: 250 TABLET ORAL at 08:22

## 2018-07-19 RX ADMIN — PRIMIDONE SCH MG: 250 TABLET ORAL at 17:00

## 2018-07-19 RX ADMIN — PRIMIDONE SCH MG: 250 TABLET ORAL at 13:34

## 2018-07-19 RX ADMIN — OLANZAPINE SCH MG: 5 TABLET, ORALLY DISINTEGRATING ORAL at 21:00

## 2018-07-19 RX ADMIN — VALPROIC ACID SCH MG: 250 SOLUTION ORAL at 09:00

## 2018-07-19 RX ADMIN — HALOPERIDOL SCH MG: 5 TABLET ORAL at 08:22

## 2018-07-19 RX ADMIN — PANTOPRAZOLE SODIUM SCH MG: 40 TABLET, DELAYED RELEASE ORAL at 07:30

## 2018-07-19 RX ADMIN — Medication SCH MG: at 21:26

## 2018-07-19 RX ADMIN — PRIMIDONE SCH MG: 250 TABLET ORAL at 09:00

## 2018-07-19 RX ADMIN — RISPERIDONE SCH MG: 0.5 TABLET, ORALLY DISINTEGRATING ORAL at 17:00

## 2018-07-20 VITALS — DIASTOLIC BLOOD PRESSURE: 56 MMHG | SYSTOLIC BLOOD PRESSURE: 133 MMHG

## 2018-07-20 VITALS — DIASTOLIC BLOOD PRESSURE: 59 MMHG | SYSTOLIC BLOOD PRESSURE: 101 MMHG

## 2018-07-20 VITALS — SYSTOLIC BLOOD PRESSURE: 104 MMHG | DIASTOLIC BLOOD PRESSURE: 59 MMHG

## 2018-07-20 LAB
ALBUMIN SERPL BCP-MCNC: 3.1 G/DL (ref 3.4–5)
ALP SERPL-CCNC: 207 U/L (ref 46–116)
ALT SERPL W P-5'-P-CCNC: 50 U/L (ref 12–78)
AST SERPL W P-5'-P-CCNC: 41 U/L (ref 15–37)
BASOPHILS # BLD AUTO: 0 /CMM (ref 0–0.2)
BASOPHILS NFR BLD AUTO: 0.1 % (ref 0–2)
BILIRUB SERPL-MCNC: 0.2 MG/DL (ref 0.2–1)
BUN SERPL-MCNC: 55 MG/DL (ref 7–18)
CALCIUM SERPL-MCNC: 9 MG/DL (ref 8.5–10.1)
CHLORIDE SERPL-SCNC: 103 MMOL/L (ref 98–107)
CK MB SERPL-MCNC: 3.9 NG/ML (ref 0–3.6)
CK SERPL-CCNC: 324 U/L (ref 26–192)
CO2 SERPL-SCNC: 26 MMOL/L (ref 21–32)
CREAT SERPL-MCNC: 2 MG/DL (ref 0.6–1.3)
EOSINOPHIL NFR BLD AUTO: 8.5 % (ref 0–6)
GLUCOSE SERPL-MCNC: 85 MG/DL (ref 74–106)
HCT VFR BLD AUTO: 26 % (ref 33–45)
HGB BLD-MCNC: 8.9 G/DL (ref 11.5–14.8)
LYMPHOCYTES NFR BLD AUTO: 1.1 /CMM (ref 0.8–4.8)
LYMPHOCYTES NFR BLD AUTO: 19 % (ref 20–44)
MAGNESIUM SERPL-MCNC: 2.3 MG/DL (ref 1.8–2.4)
MCH RBC QN AUTO: 35 PG (ref 26–33)
MCHC RBC AUTO-ENTMCNC: 34 G/DL (ref 31–36)
MCV RBC AUTO: 103 FL (ref 82–100)
MONOCYTES NFR BLD AUTO: 0.4 /CMM (ref 0.1–1.3)
MONOCYTES NFR BLD AUTO: 6.1 % (ref 2–12)
NEUTROPHILS # BLD AUTO: 4 /CMM (ref 1.8–8.9)
NEUTROPHILS NFR BLD AUTO: 66.3 % (ref 43–81)
PHOSPHATE SERPL-MCNC: 3.5 MG/DL (ref 2.5–4.9)
PLATELET # BLD AUTO: 276 /CMM (ref 150–450)
POTASSIUM SERPL-SCNC: 4.7 MMOL/L (ref 3.5–5.1)
PROT SERPL-MCNC: 7.9 G/DL (ref 6.4–8.2)
RBC # BLD AUTO: 2.53 MIL/UL (ref 4–5.2)
RDW COEFFICIENT OF VARIATION: 14.5 (ref 11.5–15)
SODIUM SERPL-SCNC: 137 MMOL/L (ref 136–145)
WBC NRBC COR # BLD AUTO: 6 K/UL (ref 4.3–11)

## 2018-07-20 RX ADMIN — PRIMIDONE SCH MG: 250 TABLET ORAL at 12:26

## 2018-07-20 RX ADMIN — BENZTROPINE MESYLATE SCH MG: 1 TABLET ORAL at 12:26

## 2018-07-20 RX ADMIN — VALPROIC ACID SCH MG: 250 SOLUTION ORAL at 12:26

## 2018-07-20 RX ADMIN — Medication SCH MG: at 21:41

## 2018-07-20 RX ADMIN — RISPERIDONE SCH MG: 0.5 TABLET, ORALLY DISINTEGRATING ORAL at 09:39

## 2018-07-20 RX ADMIN — Medication SCH MG: at 09:40

## 2018-07-20 RX ADMIN — TEMAZEPAM PRN MG: 15 CAPSULE ORAL at 21:24

## 2018-07-20 RX ADMIN — BENZTROPINE MESYLATE SCH MG: 1 TABLET ORAL at 16:33

## 2018-07-20 RX ADMIN — VALPROIC ACID SCH MG: 250 SOLUTION ORAL at 16:34

## 2018-07-20 RX ADMIN — TEMAZEPAM PRN MG: 15 CAPSULE ORAL at 21:25

## 2018-07-20 RX ADMIN — Medication SCH MG: at 09:00

## 2018-07-20 RX ADMIN — OLANZAPINE SCH MG: 5 TABLET, ORALLY DISINTEGRATING ORAL at 21:30

## 2018-07-20 RX ADMIN — HALOPERIDOL PRN MG: 5 TABLET ORAL at 18:34

## 2018-07-20 RX ADMIN — VALPROIC ACID SCH MG: 250 SOLUTION ORAL at 09:40

## 2018-07-20 RX ADMIN — BENZTROPINE MESYLATE SCH MG: 1 TABLET ORAL at 09:40

## 2018-07-20 RX ADMIN — PANTOPRAZOLE SODIUM SCH MG: 40 TABLET, DELAYED RELEASE ORAL at 09:42

## 2018-07-20 RX ADMIN — PRIMIDONE SCH MG: 250 TABLET ORAL at 16:33

## 2018-07-20 RX ADMIN — RISPERIDONE SCH MG: 0.5 TABLET, ORALLY DISINTEGRATING ORAL at 16:33

## 2018-07-20 RX ADMIN — PRIMIDONE SCH MG: 250 TABLET ORAL at 09:42

## 2018-07-20 RX ADMIN — Medication SCH TAB: at 09:40

## 2018-07-21 VITALS — DIASTOLIC BLOOD PRESSURE: 59 MMHG | SYSTOLIC BLOOD PRESSURE: 126 MMHG

## 2018-07-21 VITALS — SYSTOLIC BLOOD PRESSURE: 157 MMHG | DIASTOLIC BLOOD PRESSURE: 70 MMHG

## 2018-07-21 VITALS — SYSTOLIC BLOOD PRESSURE: 124 MMHG | DIASTOLIC BLOOD PRESSURE: 63 MMHG

## 2018-07-21 LAB — PTH-INTACT SERPL-MCNC: 249 PG/ML (ref 15–65)

## 2018-07-21 RX ADMIN — Medication SCH MG: at 09:01

## 2018-07-21 RX ADMIN — PRIMIDONE SCH MG: 250 TABLET ORAL at 16:56

## 2018-07-21 RX ADMIN — RISPERIDONE SCH MG: 0.5 TABLET, ORALLY DISINTEGRATING ORAL at 08:59

## 2018-07-21 RX ADMIN — VALPROIC ACID SCH MG: 250 SOLUTION ORAL at 09:01

## 2018-07-21 RX ADMIN — VALPROIC ACID SCH MG: 250 SOLUTION ORAL at 12:44

## 2018-07-21 RX ADMIN — BENZTROPINE MESYLATE SCH MG: 1 TABLET ORAL at 12:20

## 2018-07-21 RX ADMIN — PANTOPRAZOLE SODIUM SCH MG: 40 TABLET, DELAYED RELEASE ORAL at 09:00

## 2018-07-21 RX ADMIN — VALPROIC ACID SCH MG: 250 SOLUTION ORAL at 16:53

## 2018-07-21 RX ADMIN — RISPERIDONE SCH MG: 0.5 TABLET, ORALLY DISINTEGRATING ORAL at 16:52

## 2018-07-21 RX ADMIN — Medication SCH MG: at 22:10

## 2018-07-21 RX ADMIN — PRIMIDONE SCH MG: 250 TABLET ORAL at 12:20

## 2018-07-21 RX ADMIN — Medication SCH TAB: at 09:01

## 2018-07-21 RX ADMIN — PRIMIDONE SCH MG: 250 TABLET ORAL at 09:15

## 2018-07-21 RX ADMIN — HALOPERIDOL PRN MG: 5 TABLET ORAL at 03:36

## 2018-07-21 RX ADMIN — Medication SCH MG: at 09:00

## 2018-07-21 RX ADMIN — BENZTROPINE MESYLATE SCH MG: 1 TABLET ORAL at 16:52

## 2018-07-21 RX ADMIN — OLANZAPINE SCH MG: 5 TABLET, ORALLY DISINTEGRATING ORAL at 22:00

## 2018-07-21 RX ADMIN — BENZTROPINE MESYLATE SCH MG: 1 TABLET ORAL at 09:00

## 2018-07-21 RX ADMIN — Medication SCH MG: at 22:00

## 2018-07-22 VITALS — DIASTOLIC BLOOD PRESSURE: 82 MMHG | SYSTOLIC BLOOD PRESSURE: 132 MMHG

## 2018-07-22 VITALS — SYSTOLIC BLOOD PRESSURE: 100 MMHG | DIASTOLIC BLOOD PRESSURE: 65 MMHG

## 2018-07-22 VITALS — DIASTOLIC BLOOD PRESSURE: 51 MMHG | SYSTOLIC BLOOD PRESSURE: 85 MMHG

## 2018-07-22 VITALS — DIASTOLIC BLOOD PRESSURE: 59 MMHG | SYSTOLIC BLOOD PRESSURE: 95 MMHG

## 2018-07-22 RX ADMIN — Medication SCH MG: at 08:17

## 2018-07-22 RX ADMIN — VALPROIC ACID SCH MG: 250 SOLUTION ORAL at 08:16

## 2018-07-22 RX ADMIN — HALOPERIDOL PRN MG: 5 TABLET ORAL at 17:21

## 2018-07-22 RX ADMIN — RISPERIDONE SCH MG: 0.5 TABLET, ORALLY DISINTEGRATING ORAL at 08:19

## 2018-07-22 RX ADMIN — RISPERIDONE SCH MG: 0.5 TABLET, ORALLY DISINTEGRATING ORAL at 16:42

## 2018-07-22 RX ADMIN — VALPROIC ACID SCH MG: 250 SOLUTION ORAL at 16:42

## 2018-07-22 RX ADMIN — BENZTROPINE MESYLATE SCH MG: 1 TABLET ORAL at 08:19

## 2018-07-22 RX ADMIN — OLANZAPINE SCH MG: 5 TABLET, ORALLY DISINTEGRATING ORAL at 20:58

## 2018-07-22 RX ADMIN — VALPROIC ACID SCH MG: 250 SOLUTION ORAL at 12:44

## 2018-07-22 RX ADMIN — PANTOPRAZOLE SODIUM SCH MG: 40 TABLET, DELAYED RELEASE ORAL at 08:03

## 2018-07-22 RX ADMIN — PRIMIDONE SCH MG: 250 TABLET ORAL at 16:43

## 2018-07-22 RX ADMIN — Medication SCH TAB: at 08:18

## 2018-07-22 RX ADMIN — BENZTROPINE MESYLATE SCH MG: 1 TABLET ORAL at 16:41

## 2018-07-22 RX ADMIN — PRIMIDONE SCH MG: 250 TABLET ORAL at 12:42

## 2018-07-22 RX ADMIN — Medication SCH MG: at 08:18

## 2018-07-22 RX ADMIN — PRIMIDONE SCH MG: 250 TABLET ORAL at 09:52

## 2018-07-22 RX ADMIN — BENZTROPINE MESYLATE SCH MG: 1 TABLET ORAL at 12:44

## 2018-07-22 RX ADMIN — TEMAZEPAM PRN MG: 15 CAPSULE ORAL at 21:32

## 2018-07-22 RX ADMIN — Medication SCH MG: at 08:19

## 2018-07-23 VITALS — SYSTOLIC BLOOD PRESSURE: 123 MMHG | DIASTOLIC BLOOD PRESSURE: 76 MMHG

## 2018-07-23 VITALS — SYSTOLIC BLOOD PRESSURE: 100 MMHG | DIASTOLIC BLOOD PRESSURE: 56 MMHG

## 2018-07-23 VITALS — SYSTOLIC BLOOD PRESSURE: 148 MMHG | DIASTOLIC BLOOD PRESSURE: 91 MMHG

## 2018-07-23 LAB
ALBUMIN SERPL ELPH-MCNC: 3.6 G/DL (ref 2.9–4.4)
ALBUMIN/GLOB SERPL: 1 {RATIO} (ref 0.7–1.7)
ALPHA1 GLOB SERPL ELPH-MCNC: 0.3 G/DL (ref 0–0.4)
ALPHA2 GLOB SERPL ELPH-MCNC: 0.6 G/DL (ref 0.4–1)
B-GLOBULIN SERPL ELPH-MCNC: 0.9 G/DL (ref 0.7–1.3)
BASOPHILS # BLD AUTO: 0 /CMM (ref 0–0.2)
BASOPHILS # BLD AUTO: 0 /CMM (ref 0–0.2)
BASOPHILS NFR BLD AUTO: 0.3 % (ref 0–2)
BASOPHILS NFR BLD AUTO: 0.4 % (ref 0–2)
BUN SERPL-MCNC: 48 MG/DL (ref 7–18)
CALCIUM SERPL-MCNC: 9.1 MG/DL (ref 8.5–10.1)
CHLORIDE SERPL-SCNC: 109 MMOL/L (ref 98–107)
CO2 SERPL-SCNC: 27 MMOL/L (ref 21–32)
CREAT SERPL-MCNC: 1.7 MG/DL (ref 0.6–1.3)
EOSINOPHIL NFR BLD AUTO: 6.7 % (ref 0–6)
EOSINOPHIL NFR BLD AUTO: 9.5 % (ref 0–6)
GAMMA GLOB SERPL ELPH-MCNC: 1.8 G/DL (ref 0.4–1.8)
GLOBULIN SER CALC-MCNC: 3.6 G/DL (ref 2.2–3.9)
GLUCOSE SERPL-MCNC: 92 MG/DL (ref 74–106)
HCT VFR BLD AUTO: 24 % (ref 33–45)
HCT VFR BLD AUTO: 27 % (ref 33–45)
HGB BLD-MCNC: 8.7 G/DL (ref 11.5–14.8)
HGB BLD-MCNC: 8.9 G/DL (ref 11.5–14.8)
LYMPHOCYTES NFR BLD AUTO: 1.4 /CMM (ref 0.8–4.8)
LYMPHOCYTES NFR BLD AUTO: 1.7 /CMM (ref 0.8–4.8)
LYMPHOCYTES NFR BLD AUTO: 18.8 % (ref 20–44)
LYMPHOCYTES NFR BLD AUTO: 36.7 % (ref 20–44)
MAGNESIUM SERPL-MCNC: 2.5 MG/DL (ref 1.8–2.4)
MCH RBC QN AUTO: 35 PG (ref 26–33)
MCH RBC QN AUTO: 36 PG (ref 26–33)
MCHC RBC AUTO-ENTMCNC: 33 G/DL (ref 31–36)
MCHC RBC AUTO-ENTMCNC: 36 G/DL (ref 31–36)
MCV RBC AUTO: 100 FL (ref 82–100)
MCV RBC AUTO: 104 FL (ref 82–100)
MONOCYTES NFR BLD AUTO: 0.5 /CMM (ref 0.1–1.3)
MONOCYTES NFR BLD AUTO: 0.5 /CMM (ref 0.1–1.3)
MONOCYTES NFR BLD AUTO: 7.1 % (ref 2–12)
MONOCYTES NFR BLD AUTO: 9.8 % (ref 2–12)
NEUTROPHILS # BLD AUTO: 2 /CMM (ref 1.8–8.9)
NEUTROPHILS # BLD AUTO: 4.8 /CMM (ref 1.8–8.9)
NEUTROPHILS NFR BLD AUTO: 43.6 % (ref 43–81)
NEUTROPHILS NFR BLD AUTO: 67.1 % (ref 43–81)
PHOSPHATE SERPL-MCNC: 4.5 MG/DL (ref 2.5–4.9)
PLATELET # BLD AUTO: 261 /CMM (ref 150–450)
PLATELET # BLD AUTO: 283 /CMM (ref 150–450)
POTASSIUM SERPL-SCNC: 5.3 MMOL/L (ref 3.5–5.1)
RBC # BLD AUTO: 2.43 MIL/UL (ref 4–5.2)
RBC # BLD AUTO: 2.58 MIL/UL (ref 4–5.2)
RDW COEFFICIENT OF VARIATION: 13.2 (ref 11.5–15)
RDW COEFFICIENT OF VARIATION: 14.8 (ref 11.5–15)
SODIUM SERPL-SCNC: 143 MMOL/L (ref 136–145)
WBC NRBC COR # BLD AUTO: 4.6 K/UL (ref 4.3–11)
WBC NRBC COR # BLD AUTO: 7.3 K/UL (ref 4.3–11)

## 2018-07-23 RX ADMIN — PRIMIDONE SCH MG: 250 TABLET ORAL at 09:02

## 2018-07-23 RX ADMIN — HALOPERIDOL PRN MG: 5 TABLET ORAL at 12:19

## 2018-07-23 RX ADMIN — Medication SCH TAB: at 09:03

## 2018-07-23 RX ADMIN — RISPERIDONE SCH MG: 0.5 TABLET, ORALLY DISINTEGRATING ORAL at 17:11

## 2018-07-23 RX ADMIN — Medication SCH MG: at 21:52

## 2018-07-23 RX ADMIN — Medication SCH MG: at 09:02

## 2018-07-23 RX ADMIN — VALPROIC ACID SCH MG: 250 SOLUTION ORAL at 12:06

## 2018-07-23 RX ADMIN — PRIMIDONE SCH MG: 250 TABLET ORAL at 12:06

## 2018-07-23 RX ADMIN — VALPROIC ACID SCH MG: 250 SOLUTION ORAL at 09:08

## 2018-07-23 RX ADMIN — PRIMIDONE SCH MG: 250 TABLET ORAL at 17:11

## 2018-07-23 RX ADMIN — BENZTROPINE MESYLATE SCH MG: 1 TABLET ORAL at 17:11

## 2018-07-23 RX ADMIN — Medication SCH MG: at 09:03

## 2018-07-23 RX ADMIN — Medication SCH MG: at 10:15

## 2018-07-23 RX ADMIN — BENZTROPINE MESYLATE SCH MG: 1 TABLET ORAL at 12:05

## 2018-07-23 RX ADMIN — TEMAZEPAM PRN MG: 15 CAPSULE ORAL at 22:57

## 2018-07-23 RX ADMIN — RISPERIDONE SCH MG: 0.5 TABLET, ORALLY DISINTEGRATING ORAL at 09:03

## 2018-07-23 RX ADMIN — BENZTROPINE MESYLATE SCH MG: 1 TABLET ORAL at 09:02

## 2018-07-23 RX ADMIN — VALPROIC ACID SCH MG: 250 SOLUTION ORAL at 17:11

## 2018-07-23 RX ADMIN — OLANZAPINE SCH MG: 5 TABLET, ORALLY DISINTEGRATING ORAL at 21:52

## 2018-07-23 RX ADMIN — PANTOPRAZOLE SODIUM SCH MG: 40 TABLET, DELAYED RELEASE ORAL at 09:03

## 2018-07-24 VITALS — SYSTOLIC BLOOD PRESSURE: 135 MMHG | DIASTOLIC BLOOD PRESSURE: 71 MMHG

## 2018-07-24 VITALS — DIASTOLIC BLOOD PRESSURE: 51 MMHG | SYSTOLIC BLOOD PRESSURE: 93 MMHG

## 2018-07-24 VITALS — DIASTOLIC BLOOD PRESSURE: 52 MMHG | SYSTOLIC BLOOD PRESSURE: 103 MMHG

## 2018-07-24 LAB
ALBUMIN SERPL BCP-MCNC: 2.9 G/DL (ref 3.4–5)
ALP SERPL-CCNC: 194 U/L (ref 46–116)
ALT SERPL W P-5'-P-CCNC: 55 U/L (ref 12–78)
AST SERPL W P-5'-P-CCNC: 41 U/L (ref 15–37)
BILIRUB SERPL-MCNC: 0.1 MG/DL (ref 0.2–1)
BUN SERPL-MCNC: 55 MG/DL (ref 7–18)
CALCIUM SERPL-MCNC: 8.4 MG/DL (ref 8.5–10.1)
CHLORIDE SERPL-SCNC: 103 MMOL/L (ref 98–107)
CO2 SERPL-SCNC: 26 MMOL/L (ref 21–32)
CREAT SERPL-MCNC: 1.8 MG/DL (ref 0.6–1.3)
GLUCOSE SERPL-MCNC: 99 MG/DL (ref 74–106)
POTASSIUM SERPL-SCNC: 4 MMOL/L (ref 3.5–5.1)
PROT SERPL-MCNC: 7.4 G/DL (ref 6.4–8.2)
SODIUM SERPL-SCNC: 139 MMOL/L (ref 136–145)
VALPROATE SERPL-MCNC: 18 UG/ML (ref 50–100)

## 2018-07-24 RX ADMIN — PRIMIDONE SCH MG: 250 TABLET ORAL at 18:40

## 2018-07-24 RX ADMIN — PRIMIDONE SCH MG: 250 TABLET ORAL at 12:40

## 2018-07-24 RX ADMIN — RISPERIDONE SCH MG: 0.5 TABLET, ORALLY DISINTEGRATING ORAL at 17:38

## 2018-07-24 RX ADMIN — Medication SCH MG: at 08:24

## 2018-07-24 RX ADMIN — VALPROIC ACID SCH MG: 250 SOLUTION ORAL at 17:37

## 2018-07-24 RX ADMIN — VALPROIC ACID SCH MG: 250 SOLUTION ORAL at 12:34

## 2018-07-24 RX ADMIN — Medication SCH MG: at 21:45

## 2018-07-24 RX ADMIN — RISPERIDONE SCH MG: 0.5 TABLET, ORALLY DISINTEGRATING ORAL at 08:24

## 2018-07-24 RX ADMIN — PRIMIDONE SCH MG: 250 TABLET ORAL at 17:37

## 2018-07-24 RX ADMIN — VALPROIC ACID SCH MG: 250 SOLUTION ORAL at 18:40

## 2018-07-24 RX ADMIN — Medication SCH MG: at 08:12

## 2018-07-24 RX ADMIN — BENZTROPINE MESYLATE SCH MG: 1 TABLET ORAL at 12:35

## 2018-07-24 RX ADMIN — BENZTROPINE MESYLATE SCH MG: 1 TABLET ORAL at 18:39

## 2018-07-24 RX ADMIN — RISPERIDONE SCH MG: 0.5 TABLET, ORALLY DISINTEGRATING ORAL at 12:35

## 2018-07-24 RX ADMIN — PRIMIDONE SCH MG: 250 TABLET ORAL at 08:25

## 2018-07-24 RX ADMIN — OLANZAPINE SCH MG: 5 TABLET, ORALLY DISINTEGRATING ORAL at 21:36

## 2018-07-24 RX ADMIN — PANTOPRAZOLE SODIUM SCH MG: 40 TABLET, DELAYED RELEASE ORAL at 08:23

## 2018-07-24 RX ADMIN — VALPROIC ACID SCH MG: 250 SOLUTION ORAL at 08:23

## 2018-07-24 RX ADMIN — BENZTROPINE MESYLATE SCH MG: 1 TABLET ORAL at 08:24

## 2018-07-24 RX ADMIN — BENZTROPINE MESYLATE SCH MG: 1 TABLET ORAL at 17:38

## 2018-07-24 RX ADMIN — OLANZAPINE PRN MG: 2.5 TABLET ORAL at 20:32

## 2018-07-24 RX ADMIN — Medication SCH TAB: at 08:24

## 2018-07-24 RX ADMIN — RISPERIDONE SCH MG: 0.5 TABLET, ORALLY DISINTEGRATING ORAL at 18:40

## 2018-07-25 VITALS — SYSTOLIC BLOOD PRESSURE: 99 MMHG | DIASTOLIC BLOOD PRESSURE: 50 MMHG

## 2018-07-25 VITALS — DIASTOLIC BLOOD PRESSURE: 54 MMHG | SYSTOLIC BLOOD PRESSURE: 111 MMHG

## 2018-07-25 VITALS — DIASTOLIC BLOOD PRESSURE: 59 MMHG | SYSTOLIC BLOOD PRESSURE: 100 MMHG

## 2018-07-25 RX ADMIN — PRIMIDONE SCH MG: 250 TABLET ORAL at 12:14

## 2018-07-25 RX ADMIN — RISPERIDONE SCH MG: 0.5 TABLET, ORALLY DISINTEGRATING ORAL at 16:40

## 2018-07-25 RX ADMIN — PANTOPRAZOLE SODIUM SCH MG: 40 TABLET, DELAYED RELEASE ORAL at 08:30

## 2018-07-25 RX ADMIN — BENZTROPINE MESYLATE SCH MG: 1 TABLET ORAL at 08:30

## 2018-07-25 RX ADMIN — TEMAZEPAM PRN MG: 15 CAPSULE ORAL at 01:54

## 2018-07-25 RX ADMIN — Medication SCH TAB: at 08:30

## 2018-07-25 RX ADMIN — Medication SCH MG: at 21:08

## 2018-07-25 RX ADMIN — PRIMIDONE SCH MG: 250 TABLET ORAL at 08:30

## 2018-07-25 RX ADMIN — VALPROIC ACID SCH MG: 250 SOLUTION ORAL at 16:40

## 2018-07-25 RX ADMIN — RISPERIDONE SCH MG: 0.5 TABLET, ORALLY DISINTEGRATING ORAL at 08:30

## 2018-07-25 RX ADMIN — Medication SCH MG: at 08:30

## 2018-07-25 RX ADMIN — VALPROIC ACID SCH MG: 250 SOLUTION ORAL at 08:30

## 2018-07-25 RX ADMIN — Medication SCH MG: at 08:35

## 2018-07-25 RX ADMIN — BENZTROPINE MESYLATE SCH MG: 1 TABLET ORAL at 16:40

## 2018-07-25 RX ADMIN — OLANZAPINE SCH MG: 5 TABLET, ORALLY DISINTEGRATING ORAL at 21:04

## 2018-07-25 RX ADMIN — BENZTROPINE MESYLATE SCH MG: 1 TABLET ORAL at 12:15

## 2018-07-25 RX ADMIN — VALPROIC ACID SCH MG: 250 SOLUTION ORAL at 12:14

## 2018-07-25 RX ADMIN — PRIMIDONE SCH MG: 250 TABLET ORAL at 16:40

## 2018-07-25 RX ADMIN — RISPERIDONE SCH MG: 0.5 TABLET, ORALLY DISINTEGRATING ORAL at 12:14

## 2018-07-26 VITALS — DIASTOLIC BLOOD PRESSURE: 70 MMHG | SYSTOLIC BLOOD PRESSURE: 131 MMHG

## 2018-07-26 RX ADMIN — BENZTROPINE MESYLATE SCH MG: 1 TABLET ORAL at 12:10

## 2018-07-26 RX ADMIN — BENZTROPINE MESYLATE SCH MG: 1 TABLET ORAL at 08:07

## 2018-07-26 RX ADMIN — RISPERIDONE SCH MG: 0.5 TABLET, ORALLY DISINTEGRATING ORAL at 12:09

## 2018-07-26 RX ADMIN — RISPERIDONE SCH MG: 0.5 TABLET, ORALLY DISINTEGRATING ORAL at 08:07

## 2018-07-26 RX ADMIN — Medication SCH TAB: at 08:07

## 2018-07-26 RX ADMIN — Medication SCH MG: at 08:07

## 2018-07-26 RX ADMIN — PANTOPRAZOLE SODIUM SCH MG: 40 TABLET, DELAYED RELEASE ORAL at 08:07

## 2018-07-26 RX ADMIN — PRIMIDONE SCH MG: 250 TABLET ORAL at 08:07

## 2018-07-26 RX ADMIN — PRIMIDONE SCH MG: 250 TABLET ORAL at 12:09

## 2018-07-26 RX ADMIN — Medication SCH MG: at 08:08

## 2018-07-26 RX ADMIN — VALPROIC ACID SCH MG: 250 SOLUTION ORAL at 08:07

## 2018-07-26 RX ADMIN — OLANZAPINE PRN MG: 2.5 TABLET ORAL at 05:02

## 2018-07-26 RX ADMIN — VALPROIC ACID SCH MG: 250 SOLUTION ORAL at 12:09
